# Patient Record
Sex: MALE | Race: WHITE
[De-identification: names, ages, dates, MRNs, and addresses within clinical notes are randomized per-mention and may not be internally consistent; named-entity substitution may affect disease eponyms.]

---

## 2020-10-11 ENCOUNTER — HOSPITAL ENCOUNTER (EMERGENCY)
Dept: HOSPITAL 96 - M.ERS | Age: 30
Discharge: HOME | End: 2020-10-11
Payer: COMMERCIAL

## 2020-10-11 VITALS — BODY MASS INDEX: 27.36 KG/M2 | WEIGHT: 202.01 LBS | HEIGHT: 72 IN

## 2020-10-11 VITALS — SYSTOLIC BLOOD PRESSURE: 135 MMHG | DIASTOLIC BLOOD PRESSURE: 89 MMHG

## 2020-10-11 DIAGNOSIS — Z86.73: ICD-10-CM

## 2020-10-11 DIAGNOSIS — Z88.1: ICD-10-CM

## 2020-10-11 DIAGNOSIS — G51.0: Primary | ICD-10-CM

## 2020-10-11 DIAGNOSIS — Z79.899: ICD-10-CM

## 2020-10-12 NOTE — EKG
[place] [address]
Phone:  [phone]                     ELECTROCARDIOGRAM REPORT      
_______________________________________________________________________________
 
Name:         ABEBA HUDSON                 Room:                     [facility]#:    [id number]     Account #:     [id number]  
Admission:    10/11/20    Attend Phys:                     
Discharge:    10/11/20    Date of Birth: [birth year]  
Date of Service: 10/11/20 1340  Report #:      [id number]
        [id number]
_______________________________________________________________________________
THIS REPORT FOR:  //name//                      
 
                         [facility] ED
                                       
Test Date:    2020-10-11               Test Time:    13:40:08
Pat Name:     ABEBA HUDSON            Department:   
Patient ID:   [id number]            Room:          
Gender:                               Technician:   
:          1990               Requested By: Kim Ceja
Order Number: [id number]    Risa MD:   Alirio Narvaez
                                 Measurements
Intervals                              Axis          
Rate:         74                       P:            60
NY:           189                      QRS:          74
QRSD:         101                      T:            47
QT:           383                                    
QTc:          425                                    
                           Interpretive Statements
Sinus rhythm
ST elev, probable normal early repol pattern
No previous ECG available for comparison
Electronically Signed On 10- 18:25:12 CDT by Alirio Narvaez
[email or web address]
 
 
 
 
 
 
 
 
 
 
 
 
 
 
 
 
 
 
 
 
 
  <ELECTRONICALLY SIGNED>
                                           By: Alirio Narvaez MD, [facility]   
  10/12/20     1825
D: [id number]   _____________________________________
T: 10/11/20 1340   Alirio Narvaez MD, FACC     /EPI